# Patient Record
Sex: FEMALE | Race: WHITE
[De-identification: names, ages, dates, MRNs, and addresses within clinical notes are randomized per-mention and may not be internally consistent; named-entity substitution may affect disease eponyms.]

---

## 2018-03-16 ENCOUNTER — APPOINTMENT (OUTPATIENT)
Dept: ORTHOPEDIC SURGERY | Facility: CLINIC | Age: 63
End: 2018-03-16
Payer: COMMERCIAL

## 2018-03-16 DIAGNOSIS — S80.12XA CONTUSION OF LEFT LOWER LEG, INITIAL ENCOUNTER: ICD-10-CM

## 2018-03-16 PROCEDURE — 99213 OFFICE O/P EST LOW 20 MIN: CPT | Mod: 25

## 2018-03-16 PROCEDURE — 29540 STRAPPING ANKLE &/FOOT: CPT | Mod: LT

## 2018-03-16 RX ORDER — CEFUROXIME AXETIL 500 MG/1
500 TABLET ORAL
Qty: 20 | Refills: 0 | Status: COMPLETED | COMMUNITY
Start: 2018-01-10

## 2018-03-16 RX ORDER — SODIUM PICOSULFATE, MAGNESIUM OXIDE, AND ANHYDROUS CITRIC ACID 10; 3.5; 12 MG/16.2G; G/16.2G; G/16.2G
10-3.5-12 POWDER, METERED ORAL
Qty: 2 | Refills: 0 | Status: COMPLETED | COMMUNITY
Start: 2017-11-22

## 2018-03-16 RX ORDER — AZITHROMYCIN 500 MG/1
500 TABLET, FILM COATED ORAL
Qty: 3 | Refills: 0 | Status: COMPLETED | COMMUNITY
Start: 2018-01-05

## 2018-10-11 ENCOUNTER — APPOINTMENT (OUTPATIENT)
Dept: ORTHOPEDIC SURGERY | Facility: CLINIC | Age: 63
End: 2018-10-11
Payer: COMMERCIAL

## 2018-10-11 PROCEDURE — 99214 OFFICE O/P EST MOD 30 MIN: CPT

## 2018-10-11 NOTE — ASSESSMENT
[FreeTextEntry1] : \par 62 yo with a h/o  peroneal tendinopathy and sinus Tarsi syndrome with a history of one injection about 10 months ago that did provide good partial relief but not completely eliminate her symptoms. She wears a soft brace which is helpful.\par She has not had an MRI in several years.\par I suggested first going for a new diagnostic ultrasound to assess the peroneal tendons for any tearing or recurrence of the cyst. They can do an ultrasound-guided injection of the sinus Tarsi as well.\par She should followup in several weeks after that and we will see how her ankle is feeling. If she has ongoing pain then we may consider surgical treatment for her condition. I would get a new MRI before surgery to also better evaluate the contents of the sinus Tarsi and the peroneal tendons.\par She can take some ibuprofen if needed.\par She was also referred to physical therapy. She may want to have her try a lateral heel wedge as well.\par

## 2018-10-11 NOTE — HISTORY OF PRESENT ILLNESS
[de-identified] : Ms. Gunter comes in for followup for her left ankle where she has had pain in the past associated with peroneal tendinopathy and sinus Tarsi syndrome. She has persistent pain and swelling unless she wears a wrap. \par It had been a little better but then got worse recently which is why she made the appointment. Pain is on the lateral side of her hindfoot and points more to the sinus Tarsi then the peroneal tendons.\par It can swell during the day if she doesn't wear the wrap.\par She doesn't take any medication for it and the pain is very tolerable and doesn't affect her ability to walk. She doesn't want to have to wear a wrap forever.

## 2018-10-11 NOTE — PHYSICAL EXAM
[de-identified] : left  Foot and Ankle: \par Gait is not antalgic.\par The patient is able to walk on heels and toes and do a repetitive heel raise\par slight lateral hind foot Edema without ecchymoses, erythema.\par Ankle range of motion is with 7 degrees dorsiflexion and 35-40 degrees plantar flexion.\par Subtalar motion intact without pain.\par tender sinus \par  Deformity.\par Motor: 5/5 ATT, GS , EHL, PTT/inversion, peroneals/eversion.\par Normal neurovascular exam\par

## 2019-08-23 ENCOUNTER — APPOINTMENT (OUTPATIENT)
Dept: ORTHOPEDIC SURGERY | Facility: CLINIC | Age: 64
End: 2019-08-23
Payer: COMMERCIAL

## 2019-08-23 PROCEDURE — 99214 OFFICE O/P EST MOD 30 MIN: CPT

## 2019-08-23 PROCEDURE — 73610 X-RAY EXAM OF ANKLE: CPT | Mod: LT

## 2019-08-23 PROCEDURE — 73620 X-RAY EXAM OF FOOT: CPT | Mod: LT

## 2019-08-23 NOTE — PHYSICAL EXAM
[LE] : Sensory: Intact in bilateral lower extremities [DP] : dorsalis pedis 2+ and symmetric bilaterally [PT] : posterior tibial 2+ and symmetric bilaterally [Normal RLE] : Right Lower Extremity: No scars, rashes, lesions, ulcers, skin intact [Normal LLE] : Left Lower Extremity: No scars, rashes, lesions, ulcers, skin intact [Normal Touch] : sensation intact for touch [Normal] : No swelling, no edema, normal pedal pulses and normal temperature [de-identified] : LEFT foot and ankle\par : \par Gait is not antalgic she over a short distance\par - Edema, ecchymoses, erythema.\par Ankle range of motion is with 8 degrees dorsiflexion and 40 degrees plantar flexion.\par Subtalar motion intact with mild pain on full inversion.\par Hallux MP joint range of motion .  pain with ROM.\par Tender greatest over the peroneal tendons as they course around the cuboid. It seems more tender on the peroneals and the cuboid. Tender fourth metatarsal base\par no Deformity. Well-formed arch\par Motor: 5/5 ATT, GS , EHL, PTT/inversion, peroneals/eversion. Pain peroneal tendons with eversion\par Normal neurovascular exam\par  [de-identified] : \par X-rays LEFT foot and ankle 5 views weight-bearing today show plantar heel spur. No fractures seen in the metatarsals or elsewhere. Normal alignment. Well-formed arch.

## 2019-08-23 NOTE — HISTORY OF PRESENT ILLNESS
[de-identified] : Ms. Gnuter comes in for her pain in her LEFT ankle and foot. She was last seen about 10 months ago. When she was last treated she had peroneal tendinopathy and sinus tarsi syndrome.\par She never went for the US because it got better through the winter. It started to hurt 2 wks ago again with pain in the lateral foot.\par She hasn't taken anything for pain.\par It hurts at night and walking. No swelling

## 2019-08-23 NOTE — DISCUSSION/SUMMARY
[de-identified] : 64-year-old with history of LEFT peroneal tendinopathy and sinus Tarsi syndrome comes in now w/ pain lateral hindfoot likely exacerbation of the same underlying condition.\par Tall walking boot \par Aleve\par Ice \par Rest\par After boot , brace she was given today.\par  Diagnostic ultrasound to assess the tendons.\par \par Followup 3-4 weeks

## 2019-09-09 ENCOUNTER — APPOINTMENT (OUTPATIENT)
Dept: ORTHOPEDIC SURGERY | Facility: CLINIC | Age: 64
End: 2019-09-09
Payer: COMMERCIAL

## 2019-09-09 PROCEDURE — 99214 OFFICE O/P EST MOD 30 MIN: CPT

## 2019-09-09 NOTE — PROCEDURE
[de-identified] : I placed her in a tall pneumatic walking boot which she felt in some ways is better than her short boot in terms of the support but it did not quite fit her properly so I gave her a prescription to go to an outside surgical supply store for a boot

## 2019-09-09 NOTE — HISTORY OF PRESENT ILLNESS
[de-identified] : 65 yo comes in for her left ankle which is feeling Slightly better. She's been wearing the short walking boot that she had for the last week and a half. She had the ultrasound done showing increased ganglion cyst and degeneration intrasubstance and the peroneus longus tendon.\par She has taken a little anti-inflammatories but nothing consistent. She did not get a lateral heel wedge yet.

## 2019-09-09 NOTE — PHYSICAL EXAM
[de-identified] : Left  Foot and Ankle: \par Gait is not antalgic short distance.\par The patient is able to walk on heels and toes but feels pain in her LEFT lateral ankle walking on her toes\par mild localized Edema LEFT peroneal tendon sheath without , ecchymoses, erythema.\par Ankle range of motion is with 7 degrees dorsiflexion and 35-40 degrees plantar flexion.\par Subtalar motion intact with mild pain on full plantarflexion and inversion on the LEFT.\par No deformity.\par Motor: 5/5 ATT, GS , EHL, PTT/inversion, peroneals/eversion.Palpable thickening LEFT peroneal tendons below the lateral malleolus\par Normal neurovascular exam\par  [de-identified] : \par USleft ankle 8/23/19 showed progression of intrasubstance peroneus longus ganglion/tear and partial thickness tear of ATFL.\par

## 2019-09-09 NOTE — DISCUSSION/SUMMARY
[de-identified] : 65 yo with partial intrasubstance tear of P.L. with ganglion cyst.\par \par She is going to wear tall pneumatic walking boot for a few weeks. Ice and warm soaks and she should take an anti-inflammatory such as Aleve 2 tablets twice a day for a few days followed by 2 today for a week. Followup in about 4-5 weeks to see if it's getting better.\par If she has ongoing pain her frequent recurrent pain then surgery at some point may be considered. We talked about surgery which would be a debridement and repair of the peroneal tendons

## 2019-10-07 ENCOUNTER — APPOINTMENT (OUTPATIENT)
Dept: ORTHOPEDIC SURGERY | Facility: CLINIC | Age: 64
End: 2019-10-07
Payer: COMMERCIAL

## 2019-10-07 DIAGNOSIS — M76.72 PERONEAL TENDINITIS, LEFT LEG: ICD-10-CM

## 2019-10-07 PROCEDURE — 99213 OFFICE O/P EST LOW 20 MIN: CPT

## 2019-10-07 NOTE — PHYSICAL EXAM
[de-identified] : LEFT Foot and Ankle: \par Gait is not antalgic.\par The patient is able to walk on heels and toes and do a repetitive heel raise Normal arch dynamics\par Slight focal Edema near the peroneal tubercle. No ecchymoses, erythema.\par Ankle range of motion is with 8 degrees dorsiflexion and 35-40 degrees plantar flexion.\par Subtalar motion intact without pain or limitation.\par Mildly tender on the peroneal tendons around the peroneal tubercle was there is slight thickening of the tendon.\par No Deformity.\par Motor: 5/5 ATT, GS , EHL, PTT/inversion, peroneals/eversion.\par Normal neurovascular exam\par

## 2019-10-07 NOTE — HISTORY OF PRESENT ILLNESS
[de-identified] : Rosita comes in for followup for her LEFT ankle. I had spoken to her last week because she was wondering if she should get the MRI before today's visit but stated that her ankle was improving so we decided to hold off.\par Since I saw her she has tried Short walking boot. She couldn't wear the tall boot. She was wondering if she needs a tall boot today but overall the ankles much better. There is a slight ache or discomfort but no significant pain at all. The pain radiating along the lateral foot went away.\par She has been taking ibuprofen 400 mg in the morning with meals.

## 2019-10-07 NOTE — ASSESSMENT
[FreeTextEntry1] : 64-year-old with Peroneal tendinopathy, a small partial splits and a ganglion cyst intrasubstance in the peroneus longus tendon. She's doing much better after mobilizing in the boot. She can stop taking the ibuprofen.\par She should ice once a day.\par She will wean out of the walking boot into shoes with a lateral heel wedge and she can use the ankle support initially which is a lace up Velcro-type brace. If it's really feeling better she can stop using that as well. I will see her back in one month. I may have her do some gentle strengthening on her own but don't want to send her to physical therapy which may aggravate her ankle.\par The swelling and ganglion cyst may go down with the rest and ice. If it becomes more painful again we may get an MRI at that time but I don't see the reason now since I would not recommend surgical treatment given the improvement in her condition.\par Followup in one month

## 2019-11-07 PROBLEM — M67.49 OTHER GANGLION AND CYST OF SYNOVIUM, TENDON, AND BURSA: Status: ACTIVE | Noted: 2019-09-09

## 2019-11-08 ENCOUNTER — APPOINTMENT (OUTPATIENT)
Dept: ORTHOPEDIC SURGERY | Facility: CLINIC | Age: 64
End: 2019-11-08
Payer: COMMERCIAL

## 2019-11-08 DIAGNOSIS — M67.89 GANGLION, MULTIPLE SITES: ICD-10-CM

## 2019-11-08 DIAGNOSIS — M71.39 GANGLION, MULTIPLE SITES: ICD-10-CM

## 2019-11-08 DIAGNOSIS — S86.312A STRAIN OF MUSCLE(S) AND TENDON(S) OF PERONEAL MUSCLE GROUP AT LOWER LEG LEVEL, LEFT LEG, INITIAL ENCOUNTER: ICD-10-CM

## 2019-11-08 DIAGNOSIS — M25.572 PAIN IN LEFT ANKLE AND JOINTS OF LEFT FOOT: ICD-10-CM

## 2019-11-08 DIAGNOSIS — M67.49 GANGLION, MULTIPLE SITES: ICD-10-CM

## 2019-11-08 PROCEDURE — 99213 OFFICE O/P EST LOW 20 MIN: CPT

## 2019-11-08 NOTE — HISTORY OF PRESENT ILLNESS
[de-identified] : Rosita comes in for followup for her LEFT ankle.It has been feeling better.\par She states that usually does feel better in the winter. It had acted up a little bit but then seemed to quiet down again.\par No significant swelling. She still gets some discomfort in the lateral hindfoot area.She can do her normal activity. She doesn't get enough pain that she would think about doing surgery at this time. Functionally it seems good. She is wondering if she should do physical therapy or have more imaging\par

## 2019-11-08 NOTE — PHYSICAL EXAM
[LE] : 5/5 motor strength in bilateral lower extremities [DP] : dorsalis pedis 2+ and symmetric bilaterally [Normal RLE] : Right Lower Extremity: No scars, rashes, lesions, ulcers, skin intact [PT] : posterior tibial 2+ and symmetric bilaterally [Normal Touch] : sensation intact for touch [Normal LLE] : Left Lower Extremity: No scars, rashes, lesions, ulcers, skin intact [Normal] : No swelling, no edema, normal pedal pulses and normal temperature [de-identified] : LEFT  Foot and Ankle: \par Gait is not antalgic.\par The patient is able to walk on heels and toes \par no Edema, ecchymoses, erythema.\par Ankle range of motion is with 6 degrees dorsiflexion and 35-40 degrees plantar flexion.\par Tender mildly over the peroneal tendons over the cuboid area Distal to the tip of the lateral malleolus\par No Deformity.\par Motor: 5/5 ATT, GS , EHL, PTT/inversion, peroneals/eversion.\par Normal neurovascular exam\par

## 2019-11-08 NOTE — DISCUSSION/SUMMARY
[de-identified] :  64-year-old with LEFT ankle peroneal tendinopathy with small partial splits and ganglion cyst intrasubstance peroneus longus\par Symptomatically the ankle is doing significantly better but not quite 100 percent. She'll do a little physical therapy at her mind her the exercises to strengthen her ankle. She continues a lateral heel wedge and she'll wear her winter shoes which seemed to work better for the ankle. If she starts to get pain again I would like for her to get a followup MRI to assess the peroneals. Her last MRI was about 3-1/2 years ago.\par If she were to have ongoing pain then surgery to debride the peroneal tendons/repair of the tears and removed the ganglion could be considered.\par Followup as needed for any increase in pain.

## 2022-06-22 ENCOUNTER — APPOINTMENT (OUTPATIENT)
Dept: ORTHOPEDIC SURGERY | Facility: CLINIC | Age: 67
End: 2022-06-22
Payer: COMMERCIAL

## 2022-06-22 ENCOUNTER — TRANSCRIPTION ENCOUNTER (OUTPATIENT)
Age: 67
End: 2022-06-22

## 2022-06-22 DIAGNOSIS — M67.88 OTHER SPECIFIED DISORDERS OF SYNOVIUM AND TENDON, OTHER SITE: ICD-10-CM

## 2022-06-22 DIAGNOSIS — R60.0 LOCALIZED EDEMA: ICD-10-CM

## 2022-06-22 DIAGNOSIS — R73.03 PREDIABETES.: ICD-10-CM

## 2022-06-22 PROCEDURE — 99214 OFFICE O/P EST MOD 30 MIN: CPT

## 2022-06-22 PROCEDURE — 73620 X-RAY EXAM OF FOOT: CPT | Mod: LT

## 2022-06-22 PROCEDURE — 73610 X-RAY EXAM OF ANKLE: CPT | Mod: LT

## 2022-06-22 RX ORDER — METFORMIN HYDROCHLORIDE 500 MG/1
500 TABLET, COATED ORAL
Qty: 180 | Refills: 0 | Status: ACTIVE | COMMUNITY
Start: 2021-04-06

## 2022-06-22 NOTE — REASON FOR VISIT
[Follow-Up Visit] : a follow-up visit for [FreeTextEntry2] : left ankle pain and bilateral lower leg swelling intermittently

## 2022-06-22 NOTE — ASSESSMENT
[FreeTextEntry1] : 67-year-old with left ankle peroneal tendinopathy, a small partial splits and a ganglion cyst intrasubstance in the peroneus longus tendon. She had a contusion to her toes and that looks like there are nondisplaced fractures of the fourth and fifth toe proximal phalanx and possible fifth toe middle phalanx.  I showed her how to jo-ann tape the toes.  She should get a stiff supportive wide shoe.  She may want to try [employer] sneakers.  She has a walking boot at home that we used to treat her peroneal tendon issue in 2019 and she can use that temporarily if needed.  Elevate and ice.\par \par She can use compression stockings for the edema and she should avoid sitting in the chair that seems to cause the swelling.  If she is still getting swelling at all then she should see her medical doctor since the swelling has been in both legs symmetrically.  She can elevate for swelling as well.\par She has not been immobilized and no history of DVT.\par Follow-up 3 to 4 weeks

## 2022-06-22 NOTE — HISTORY OF PRESENT ILLNESS
[de-identified] : Ms. Gunter is now 67 years old and comes in for followup for her LEFT foot and ankle which started hurting again.  She was last seen about 2-1/2 years ago.\par Her ankle was better until a couple wks ago.  There was no injury although she had worn it in.  Shoes that are very flat and then she started getting pain.  She is also getting swelling in her legs.\par She also had hit her toes on something and stop them and thought there might be a fracture.\par When sitting at a particular chair her feet swell a lot bilaterally. It doesn't occur sitting in any other chair.  If she does not use that chair her leg still swells so it seems to be related to the chair.  She does tend to have large legs.  She states that she has gained some weight recently as well\par She has the lace up brace. \par Pain is mostly in the lateral left forefoot to midfoot region and not the hindfoot or ankle

## 2022-06-22 NOTE — PHYSICAL EXAM
[LE] : Sensory: Intact in bilateral lower extremities [DP] : dorsalis pedis 2+ and symmetric bilaterally [PT] : posterior tibial 2+ and symmetric bilaterally [Normal RLE] : Right Lower Extremity: No scars, rashes, lesions, ulcers, skin intact [Normal LLE] : Left Lower Extremity: No scars, rashes, lesions, ulcers, skin intact [Normal Touch] : sensation intact for touch [Normal] : No swelling, no edema, normal pedal pulses and normal temperature [de-identified] : LEFT  Foot and Ankle: \par Gait is not antalgic.  She feels mild pain\par The patient is able to walk on heels and toes.  Mild pain walking on her toes and the lateral forefoot\par no Edema, ecchymoses, erythema.\par Ankle range of motion is with 6 degrees dorsiflexion and 35-40 degrees plantar flexion.\par Tender greatest near the knee proximal fourth and fifth metatarsals to the cuboid.  Very tender in the proximal fourth and fifth toes minimally over the peroneal tendons over the cuboid area distal to the tip of the lateral malleolus\par No Deformity.\par Motor: 5/5 ATT, GS , EHL, PTT/inversion, peroneals/eversion.\par Normal neurovascular exam\par Feet are warm.\par There is mild edema pitting in the left midfoot  [de-identified] : \par X-rays of the left foot and ankle weightbearing 5 views today showed probable fracture of the fifth toe proximal phalanx and possibly also the fourth toe proximal phalanx.  Ankle is unremarkable.\par \par US left ankle 8/23/19 showed progression of intrasubstance peroneus longus ganglion/tear and partial thickness tear of ATFL.\par \par MRI of the left ankle July 2016 showed severe peroneus longus tendinosis and intrasubstance ganglion low-grade partial tear of the ATFL

## 2022-07-13 ENCOUNTER — APPOINTMENT (OUTPATIENT)
Dept: ORTHOPEDIC SURGERY | Facility: CLINIC | Age: 67
End: 2022-07-13

## 2022-07-13 DIAGNOSIS — M79.672 PAIN IN LEFT FOOT: ICD-10-CM

## 2022-07-13 DIAGNOSIS — S92.515D NONDISPLACED FRACTURE OF PROXIMAL PHALANX OF LEFT LESSER TOE(S), SUBSEQUENT ENCOUNTER FOR FRACTURE WITH ROUTINE HEALING: ICD-10-CM

## 2022-07-13 PROCEDURE — 99213 OFFICE O/P EST LOW 20 MIN: CPT

## 2022-07-13 NOTE — ASSESSMENT
[FreeTextEntry1] : 67-year-old with left ankle peroneal tendinopathy, a small partial splits and a ganglion cyst intrasubstance in the peroneus longus tendon  had a contusion to her left foot with possible nondisplaced fractures of the fourth and fifth toe proximal phalanx and possible fifth toe middle phalanx.  Toes are feeling much better.  She can continue to buddy tape for couple more weeks and continue with the supportive shoes.  She should not sit in the 1 chair that causes her foot to swell.  Its unusual but there must be something about the position if that is the only time it happens.  If she starts to get swelling otherwise she should let me know.  She had very good pulses and circulation appeared normal and there was no swelling this morning on exam.\par Her peroneal tendons do not seem to be symptomatic right now at all.  She can follow-up as needed.

## 2022-07-13 NOTE — PHYSICAL EXAM
[LE] : Sensory: Intact in bilateral lower extremities [DP] : dorsalis pedis 2+ and symmetric bilaterally [PT] : posterior tibial 2+ and symmetric bilaterally [Normal RLE] : Right Lower Extremity: No scars, rashes, lesions, ulcers, skin intact [Normal LLE] : Left Lower Extremity: No scars, rashes, lesions, ulcers, skin intact [Normal Touch] : sensation intact for touch [Normal] : No swelling, no edema, normal pedal pulses and normal temperature [de-identified] : LEFT  Foot and Ankle: \par Gait is not antalgic.  No pain\par The patient is able to walk on heels and toes.  \par no Edema, ecchymoses, erythema.\par Ankle range of motion is with 6 degrees dorsiflexion and 35-40 degrees plantar flexion.\par Minimal tenderness proximal fourth and fifth toes.  No other tenderness in the foot and ankle.\par No deformity of the toes\par No Deformity.\par Motor: 5/5 ATT, GS , EHL, PTT/inversion, peroneals/eversion.\par Normal neurovascular exam\par Feet are warm.\par There is mild edema pitting in the left midfoot  [de-identified] : \par X-rays of the left foot and ankle weightbearing 5 views June 22, 2022 showed probable fracture of the fifth toe proximal phalanx and possibly also the fourth toe proximal phalanx.  Ankle is unremarkable.\par \par US left ankle 8/23/19 showed progression of intrasubstance peroneus longus ganglion/tear and partial thickness tear of ATFL.\par \par MRI of the left ankle July 2016 showed severe peroneus longus tendinosis and intrasubstance ganglion low-grade partial tear of the ATFL

## 2022-07-13 NOTE — HISTORY OF PRESENT ILLNESS
[de-identified] : Ms. Gunter is now 67 years old and comes in for followup for her LEFT foot injury to her fourth and fifth toes and swelling.\par She states that the toe pain is getting much better.  She was jo-ann taping but was not sure if she was doing it right.  She is wearing stiff supportive shoes.  She still got swelling when she sat in the 1 chair but swelling in the left leg does not occur with any other chair or position or anything else she does.

## 2022-11-17 ENCOUNTER — APPOINTMENT (OUTPATIENT)
Dept: ORTHOPEDIC SURGERY | Facility: CLINIC | Age: 67
End: 2022-11-17

## 2022-11-17 DIAGNOSIS — S90.111A CONTUSION OF RIGHT GREAT TOE W/OUT DAMAGE TO NAIL, INITIAL ENCOUNTER: ICD-10-CM

## 2022-11-17 DIAGNOSIS — S93.491A SPRAIN OF OTHER LIGAMENT OF RIGHT ANKLE, INITIAL ENCOUNTER: ICD-10-CM

## 2022-11-17 DIAGNOSIS — S90.811A ABRASION, RIGHT FOOT, INITIAL ENCOUNTER: ICD-10-CM

## 2022-11-17 PROCEDURE — 73620 X-RAY EXAM OF FOOT: CPT | Mod: RT

## 2022-11-17 PROCEDURE — 99214 OFFICE O/P EST MOD 30 MIN: CPT

## 2022-11-17 PROCEDURE — 73610 X-RAY EXAM OF ANKLE: CPT | Mod: RT

## 2022-11-17 NOTE — PHYSICAL EXAM
Telemetry strip printed, interpreted as SINUS RHYTHM WITH PVC at 81 bpm, and 
placed on the chart. [LE] : Sensory: Intact in bilateral lower extremities [DP] : dorsalis pedis 2+ and symmetric bilaterally [PT] : posterior tibial 2+ and symmetric bilaterally [Normal RLE] : Right Lower Extremity: No scars, rashes, lesions, ulcers, skin intact [Normal LLE] : Left Lower Extremity: No scars, rashes, lesions, ulcers, skin intact [Normal Touch] : sensation intact for touch [Normal] : No swelling, no edema, normal pedal pulses and normal temperature [de-identified] : Right foot and Ankle: \par Gait is mild to moderately antalgic. \par There is moderately severe pitting edema throughout the foot to the ankle.\par No calf tenderness.\par Tender over the tip of the hallux and through the lateral midfoot to hindfoot to lateral ankle.  Mildly tender in the medial midfoot.\par Ankle range of motion is with 6 degrees dorsiflexion and 30 degrees plantar flexion.\par No deformity of the toes\par No Deformity ankle\par Motor: Intact ATT, GS , EHL, PTT/inversion, peroneals/eversion.\par Normal neurovascular exam\par Feet are warm.\par  [de-identified] : \par X-rays of the right foot and ankle weightbearing 5 views today showed no definite fractures.  There appears to be an old avulsion medial malleolus.  She is nontender.  There is also a fractured osteophyte at the hallux MP joint that appears old and rounded.  There is arthritis at the hallux MP and IP joints.  No definite fracture of the hallux phalanges.\par No fractures seen through the metatarsals or cuboid Or anterior process of the calcaneus.\par Tarsometatarsal joints appear aligned normally

## 2022-11-17 NOTE — PROCEDURE
[de-identified] : Fitted for a short pneumatic walking boot.  The 2 wounds were antibiotic ointment was placed over the 2 wounds followed by Band-Aids.  She should keep these clean and covered.  In the shower she can let water run on it to keep it clean.

## 2022-11-17 NOTE — ASSESSMENT
[FreeTextEntry1] : 67-year-old with right foot and ankle sprain and probably some bone bruises and there is 2 abrasions do not appear infected at this time.  If he starts to notice more erythema, swelling, pain and wound drainage she should let me know immediately.  She should wear the boot walking but take it off when lying down or sitting.  Elevate her foot above her heart for the swelling.  She can move the ankle when the boot is off for circulation and range of motion.  Walking in the boot she really had no significant pain.\par Follow-up in about 2 weeks to check and make sure it is improving.  She should call sooner if there are any issues.

## 2022-11-17 NOTE — HISTORY OF PRESENT ILLNESS
[de-identified] : Ms. Gunter is 67 years old and comes in for new injury to her right foot and ankle that occurred when she was in [place] last weekend.  She was in the bathroom and twisted her foot.  There are abrasions over the dorsal foot and ankle where she hit it.  There has been a lot of swelling.  I treated her in the past and she had a lot of swelling as well.  She has not really been elevating.  She is walking in regular shoes with some pain but did not feel like it was broken.  When she fell she had banged the tips of her hallux and second toe on a ledge and they are bruised and numb feeling.

## 2022-12-08 ENCOUNTER — APPOINTMENT (OUTPATIENT)
Dept: ORTHOPEDIC SURGERY | Facility: CLINIC | Age: 67
End: 2022-12-08

## 2024-10-24 ENCOUNTER — APPOINTMENT (OUTPATIENT)
Dept: ORTHOPEDIC SURGERY | Facility: CLINIC | Age: 69
End: 2024-10-24
Payer: COMMERCIAL

## 2024-10-24 DIAGNOSIS — M41.50 OTHER SECONDARY SCOLIOSIS, SITE UNSPECIFIED: ICD-10-CM

## 2024-10-24 DIAGNOSIS — M54.50 LOW BACK PAIN, UNSPECIFIED: ICD-10-CM

## 2024-10-24 DIAGNOSIS — G89.29 LOW BACK PAIN, UNSPECIFIED: ICD-10-CM

## 2024-10-24 PROCEDURE — 99214 OFFICE O/P EST MOD 30 MIN: CPT

## 2024-10-24 PROCEDURE — 72100 X-RAY EXAM L-S SPINE 2/3 VWS: CPT

## 2024-10-28 ENCOUNTER — APPOINTMENT (OUTPATIENT)
Dept: ORTHOPEDIC SURGERY | Facility: CLINIC | Age: 69
End: 2024-10-28

## 2024-12-18 ENCOUNTER — APPOINTMENT (OUTPATIENT)
Dept: ORTHOPEDIC SURGERY | Facility: CLINIC | Age: 69
End: 2024-12-18